# Patient Record
Sex: FEMALE | Race: BLACK OR AFRICAN AMERICAN
[De-identification: names, ages, dates, MRNs, and addresses within clinical notes are randomized per-mention and may not be internally consistent; named-entity substitution may affect disease eponyms.]

---

## 2023-01-02 ENCOUNTER — HOSPITAL ENCOUNTER (EMERGENCY)
Dept: HOSPITAL 26 - MED | Age: 38
Discharge: HOME | End: 2023-01-02
Payer: COMMERCIAL

## 2023-01-02 VITALS — SYSTOLIC BLOOD PRESSURE: 135 MMHG | DIASTOLIC BLOOD PRESSURE: 77 MMHG

## 2023-01-02 VITALS — BODY MASS INDEX: 24.91 KG/M2 | WEIGHT: 155 LBS | HEIGHT: 66 IN

## 2023-01-02 DIAGNOSIS — Z98.890: ICD-10-CM

## 2023-01-02 DIAGNOSIS — Z79.899: ICD-10-CM

## 2023-01-02 DIAGNOSIS — B34.9: Primary | ICD-10-CM

## 2023-01-02 DIAGNOSIS — M54.50: ICD-10-CM

## 2023-01-02 DIAGNOSIS — R05.9: ICD-10-CM

## 2023-01-02 DIAGNOSIS — Z20.822: ICD-10-CM

## 2023-01-02 NOTE — NUR
Patient discharged with v/s stable. Written and verbal after care instructions 
given and explained. 

Patient alert, oriented and verbalized understanding of instructions. 
Ambulatory with steady gait. All questions addressed prior to discharge. ID 
band removed. Patient advised to follow up with PMD. Rx of BENZONATATE given.

## 2023-01-02 NOTE — NUR
37YR OLD FEMALE BIB SELF C/O FLU LIKE SX. PT STATES HAVING COVID A MONTH AGO 
COUGH, CONGESTION AND BODY MALAISE.  PT IS AFEBRILE.  A&OX2 RESP EVEN AND 
UNLABORED.  PT SITTING UP IN BED WITH HOB ELEVATED.  BED AT LOWEST POSITION.



NKDA

ASTHMA